# Patient Record
Sex: MALE | Race: WHITE | NOT HISPANIC OR LATINO | Employment: OTHER | URBAN - METROPOLITAN AREA
[De-identification: names, ages, dates, MRNs, and addresses within clinical notes are randomized per-mention and may not be internally consistent; named-entity substitution may affect disease eponyms.]

---

## 2024-05-08 ENCOUNTER — OFFICE VISIT (OUTPATIENT)
Dept: FAMILY MEDICINE | Facility: CLINIC | Age: 64
End: 2024-05-08
Payer: COMMERCIAL

## 2024-05-08 VITALS
HEART RATE: 78 BPM | SYSTOLIC BLOOD PRESSURE: 139 MMHG | HEIGHT: 67 IN | RESPIRATION RATE: 19 BRPM | TEMPERATURE: 97.8 F | BODY MASS INDEX: 26.53 KG/M2 | WEIGHT: 169 LBS | DIASTOLIC BLOOD PRESSURE: 84 MMHG | OXYGEN SATURATION: 100 %

## 2024-05-08 DIAGNOSIS — D50.9 IRON DEFICIENCY ANEMIA, UNSPECIFIED IRON DEFICIENCY ANEMIA TYPE: ICD-10-CM

## 2024-05-08 DIAGNOSIS — I21.4 NSTEMI (NON-ST ELEVATED MYOCARDIAL INFARCTION) (H): ICD-10-CM

## 2024-05-08 DIAGNOSIS — Z09 HOSPITAL DISCHARGE FOLLOW-UP: Primary | ICD-10-CM

## 2024-05-08 DIAGNOSIS — E11.9 TYPE 2 DIABETES MELLITUS WITHOUT COMPLICATION, WITHOUT LONG-TERM CURRENT USE OF INSULIN (H): ICD-10-CM

## 2024-05-08 PROCEDURE — 99204 OFFICE O/P NEW MOD 45 MIN: CPT | Performed by: FAMILY MEDICINE

## 2024-05-08 RX ORDER — NITROGLYCERIN 0.4 MG/1
0.4 TABLET SUBLINGUAL
COMMUNITY
Start: 2024-05-03

## 2024-05-08 RX ORDER — BLOOD SUGAR DIAGNOSTIC
STRIP MISCELLANEOUS
COMMUNITY
Start: 2024-05-03 | End: 2024-05-30

## 2024-05-08 RX ORDER — PANTOPRAZOLE SODIUM 40 MG/1
40 TABLET, DELAYED RELEASE ORAL
COMMUNITY
Start: 2024-05-04 | End: 2024-09-24

## 2024-05-08 RX ORDER — INSULIN GLARGINE 100 [IU]/ML
15 INJECTION, SOLUTION SUBCUTANEOUS
COMMUNITY
Start: 2024-05-03 | End: 2024-06-25

## 2024-05-08 RX ORDER — ASPIRIN 81 MG/1
81 TABLET, CHEWABLE ORAL
COMMUNITY
Start: 2024-05-04

## 2024-05-08 RX ORDER — METOPROLOL SUCCINATE 25 MG/1
12.5 TABLET, EXTENDED RELEASE ORAL
COMMUNITY
Start: 2024-05-04

## 2024-05-08 RX ORDER — CLOPIDOGREL BISULFATE 75 MG/1
75 TABLET ORAL
COMMUNITY
Start: 2024-05-07

## 2024-05-08 RX ORDER — LOSARTAN POTASSIUM 25 MG/1
12.5 TABLET ORAL
COMMUNITY
Start: 2024-05-04

## 2024-05-08 RX ORDER — ROSUVASTATIN CALCIUM 40 MG/1
40 TABLET, COATED ORAL DAILY
COMMUNITY
Start: 2024-05-03

## 2024-05-08 RX ORDER — PRASUGREL 10 MG/1
1 TABLET, FILM COATED ORAL EVERY MORNING
COMMUNITY
Start: 2024-05-03 | End: 2024-06-25 | Stop reason: ALTCHOICE

## 2024-05-08 ASSESSMENT — PAIN SCALES - GENERAL: PAINLEVEL: NO PAIN (0)

## 2024-05-08 ASSESSMENT — PATIENT HEALTH QUESTIONNAIRE - PHQ9
SUM OF ALL RESPONSES TO PHQ QUESTIONS 1-9: 0
10. IF YOU CHECKED OFF ANY PROBLEMS, HOW DIFFICULT HAVE THESE PROBLEMS MADE IT FOR YOU TO DO YOUR WORK, TAKE CARE OF THINGS AT HOME, OR GET ALONG WITH OTHER PEOPLE: NOT DIFFICULT AT ALL
SUM OF ALL RESPONSES TO PHQ QUESTIONS 1-9: 0

## 2024-05-08 NOTE — PROGRESS NOTES
"  Assessment & Plan     Hospital discharge follow-up   Admitted for NSTEMI, had stents placed and started on anticougulation    NSTEMI     S/p Stent placement. On DOAC for 1 year and ASA lifetime.    Heart failure with reduced ejection fraction, NYHA class I (H)   -  stable at this time.    Type 2 diabetes mellitus without complication, with long-term current use of insulin (H): New Diagnosis    - New diagnosis - HA1c 13.7%  SGLT2i were cost prohibitive, our clinic will work on patient assistance and start as out-pt. (On Insulin and Jardiance)    Iron deficiency anemia, unspecified iron deficiency anemia type     Hgb stable 11-12. Iron studies showed low iron level, ferritin normal  - he will need GI work-up (colonoscopy +/- EGD) in coming months     MED REC REQUIRED  Post Medication Reconciliation Status: discharge medications reconciled, continue medications without change  BMI  Estimated body mass index is 26.53 kg/m  as calculated from the following:    Height as of this encounter: 1.7 m (5' 6.93\").    Weight as of this encounter: 76.7 kg (169 lb).   Weight management plan: Discussed healthy diet and exercise guidelines      See Patient Instructions    Subjective   Yandel is a 64 year old, presenting for the following health issues:  Hospital F/U (New Patient - Establish Care )        5/8/2024    10:50 AM   Additional Questions   Roomed by KENDRA MCCOLLUM   Accompanied by self     HPI   Hospital Follow-up Visit:  Hospital/Nursing Home/IP Rehab Facility:  [facility]  Date of Admission: 5/1/2024  Date of Discharge: 5/3/2024  Reason(s) for Admission: ST elevation myocardial infarction (STEMI), unspecified artery (HC) (Primary Dx);  Diabetic ketoacidosis without coma associated with type 2 diabetes mellitus (HC);  Anemia, unspecified type;  Heart failure with mildly reduced ejection fraction (HFmrEF) (HC)  Discharge Disposition: Home Self Care   Was the patient in the ICU or did the patient experience delirium during " hospitalization?  No  Do you have any other stressors you would like to discuss with your provider? No    Problems taking medications regularly:  None  Medication changes since discharge: None  Problems adhering to non-medication therapy:  None    Summary of hospitalization:  [facility] information obtained and reviewed      Admission Date: 5/1/2024  Discharge Date: 5/3/2024     DISCHARGE DIAGNOSIS:  Inferolateral MI  Acute total occlusion of pLCx, severe disease in pPDA, ostial and mid LAD, D1. S/p PCI to pLCx and pPDA Plan for staged PCI to LAD and diag in 2-3 weeks  ASA 81mg QD, Effient 10mg QD for one year, rosuvastatin and BB as below.   HFmrEF, ICM, LVEF 40-45%  With basal to mid inferior and inferolateral HK.   Toprol XL 12.5mg QD and losartan 12.5mg QD  Hyperlipidemia  FLP this admission - , , with goals < 150 and 55, respectively.  Rosuvastatin 40mg QD  DM Type II  New diagnosis - HA1c 13.7%  SGLT2i were cost prohibitive, our clinic will work on patient assistance and start as out-pt. (Jardiance. Invokana, Farxiga)  Microcytic anemia  Hgb stable 11-12. Iron studies showed low iron level, ferritin normal     Further work-up per IM   - he will need GI work-up (colonoscopy +/- EGD) in coming months - ok to hold Effient at that time but would continue ASA.  Need to follow Hgb on DAPT     Post hospital f/up  Post-hospital follow up at [facility] with Edmond Sanchez PA-C on 5/7/24 with BMP and Hgb at this visit.   Ongoing optimization of GDMT.   Needs to establish with PCP for ongoing diabetes management.   Staged PCI to LAD and D1 in 2-3 weeks, ordered and consented. Repeat BMP and CBC at time of staged PCI.   Cardiac rehab discussed and recommended.  Repeat fasting lipids in 6-8 weeks.   Repeat TTE in 2-3 months with optimal GDMT.   Out-patient consult to DM nurse educator.       Meds:   Lantus Solostar U-100 Insulin 100 unit/mL (3 mL) pen: Inject 15 units subcutaneous before bedtime.    losartan 25  "mg tablet  Take one-half tablet (12.5 mg) by mouth once daily.    metoprolol succinate 25 mg Sustained-Release tablet  (TOPROL XL)  Take one-half tablet (12.5 mg) by mouth once daily.    nitroglycerin 0.4 mg sublingual tablet  Place 1 Tablet (0.4 mg) under the tongue every 5 minutes if needed for Chest Pain. If no relief after 3 tablets call 911    pantoprazole 40 mg delayed-release tablet  For diagnoses: ST elevation myocardial infarction (STEMI), unspecified artery (HC), Anemia, unspecified type  Start taking on: May 4, 2024  Commonly known as: PROTONIX  Take 1 Tablet (40 mg) by mouth once daily before a meal.    prasugreL 10 mg Tab tablet  For diagnoses: ST elevation myocardial infarction (STEMI), unspecified artery (HC)  Commonly known as: EFFIENT  Take 1 Tablet (10 mg) by mouth every morning for 30 days then switch to Plavix.    rosuvastatin 40 mg tablet  For diagnoses: ST elevation myocardial infarction (STEMI), unspecified artery (HC)  Commonly known as: CRESTOR  Take 1 Tablet (40 mg) by mouth at bedtime.   Plan of care communicated with patient  Diagnostic Tests/Treatments reviewed.  Follow up needed: none  Other Healthcare Providers Involved in Patient s Care:         Specialist appointment - cardiology  Update since discharge: improved.       Review of Systems  Constitutional, HEENT, pulmonary, gi and gu systems are negative, except as otherwise noted.      Objective    /84 (BP Location: Right arm, Cuff Size: Adult Regular)   Pulse 78   Temp 97.8  F (36.6  C) (Oral)   Resp 19   Ht 1.7 m (5' 6.93\")   Wt 76.7 kg (169 lb)   SpO2 100%   BMI 26.53 kg/m    Body mass index is 26.53 kg/m .  Physical Exam   GENERAL: alert and no distress  RESP: lungs clear to auscultation - no rales, rhonchi or wheezes  CV: regular rate and rhythm,  no murmur, click or rub, no peripheral edema  ABDOMEN: soft, nontender, no masses and bowel sounds normal  MS: no gross musculoskeletal defects noted, no edema  PSYCH: " mentation appears normal, affect normal/bright    Signed Electronically by: Earl López MD    Answers submitted by the patient for this visit:  Patient Health Questionnaire (Submitted on 5/8/2024)  If you checked off any problems, how difficult have these problems made it for you to do your work, take care of things at home, or get along with other people?: Not difficult at all  PHQ9 TOTAL SCORE: 0

## 2024-05-30 DIAGNOSIS — Z79.4 TYPE 2 DIABETES MELLITUS WITH HYPERGLYCEMIA, WITH LONG-TERM CURRENT USE OF INSULIN (H): Primary | ICD-10-CM

## 2024-05-30 DIAGNOSIS — E11.65 TYPE 2 DIABETES MELLITUS WITH HYPERGLYCEMIA, WITH LONG-TERM CURRENT USE OF INSULIN (H): Primary | ICD-10-CM

## 2024-05-30 NOTE — TELEPHONE ENCOUNTER
Medication Question or Refill        What medication are you calling about (include dose and sig)?: blood glucose (OPTIUM TEST STRIPS) test strip (patient was told to test 4 times a day, 100 does not last a whole month)    Preferred Pharmacy:   [id number] - [place] - [address] AT [facility] & [place]  [address] [facility]  [place] [address]  Phone: [phone] Fax: [phone]      Controlled Substance Agreement on file:   CSA -- Patient Level:    CSA: None found at the patient level.       Who prescribed the medication?: PCP    Do you need a refill? Yes    When did you use the medication last? Daily, x 4 a day    Patient offered an appointment? No    Do you have any questions or concerns?  Yes: patient only has a few left      Okay to leave a detailed message?: Yes at [phone] Mita , spouse

## 2024-05-31 RX ORDER — BLOOD SUGAR DIAGNOSTIC
STRIP MISCELLANEOUS
Qty: 120 STRIP | Refills: 1 | Status: SHIPPED | OUTPATIENT
Start: 2024-05-31 | End: 2024-07-01

## 2024-06-25 ENCOUNTER — OFFICE VISIT (OUTPATIENT)
Dept: FAMILY MEDICINE | Facility: CLINIC | Age: 64
End: 2024-06-25
Payer: COMMERCIAL

## 2024-06-25 VITALS
BODY MASS INDEX: 26.21 KG/M2 | WEIGHT: 167 LBS | HEIGHT: 67 IN | OXYGEN SATURATION: 100 % | DIASTOLIC BLOOD PRESSURE: 72 MMHG | SYSTOLIC BLOOD PRESSURE: 111 MMHG | HEART RATE: 66 BPM | RESPIRATION RATE: 18 BRPM | TEMPERATURE: 98.4 F

## 2024-06-25 DIAGNOSIS — Z12.11 SCREEN FOR COLON CANCER: ICD-10-CM

## 2024-06-25 DIAGNOSIS — Z23 NEED FOR SHINGLES VACCINE: ICD-10-CM

## 2024-06-25 DIAGNOSIS — E78.5 HYPERLIPIDEMIA LDL GOAL <70: ICD-10-CM

## 2024-06-25 DIAGNOSIS — D50.9 IRON DEFICIENCY ANEMIA, UNSPECIFIED IRON DEFICIENCY ANEMIA TYPE: ICD-10-CM

## 2024-06-25 DIAGNOSIS — Z11.59 NEED FOR HEPATITIS C SCREENING TEST: ICD-10-CM

## 2024-06-25 DIAGNOSIS — Z11.4 SCREENING FOR HIV (HUMAN IMMUNODEFICIENCY VIRUS): ICD-10-CM

## 2024-06-25 DIAGNOSIS — I25.10 CORONARY ARTERY DISEASE INVOLVING NATIVE HEART WITHOUT ANGINA PECTORIS, UNSPECIFIED VESSEL OR LESION TYPE: ICD-10-CM

## 2024-06-25 DIAGNOSIS — Z79.4 TYPE 2 DIABETES MELLITUS WITH HYPERGLYCEMIA, WITH LONG-TERM CURRENT USE OF INSULIN (H): Primary | ICD-10-CM

## 2024-06-25 DIAGNOSIS — Z29.11 NEED FOR VACCINATION AGAINST RESPIRATORY SYNCYTIAL VIRUS: ICD-10-CM

## 2024-06-25 DIAGNOSIS — Z12.5 SCREENING FOR PROSTATE CANCER: ICD-10-CM

## 2024-06-25 DIAGNOSIS — E11.65 TYPE 2 DIABETES MELLITUS WITH HYPERGLYCEMIA, WITH LONG-TERM CURRENT USE OF INSULIN (H): Primary | ICD-10-CM

## 2024-06-25 DIAGNOSIS — Z23 NEED FOR TDAP VACCINATION: ICD-10-CM

## 2024-06-25 PROCEDURE — 90677 PCV20 VACCINE IM: CPT | Performed by: FAMILY MEDICINE

## 2024-06-25 PROCEDURE — 99214 OFFICE O/P EST MOD 30 MIN: CPT | Mod: 25 | Performed by: FAMILY MEDICINE

## 2024-06-25 PROCEDURE — G0009 ADMIN PNEUMOCOCCAL VACCINE: HCPCS | Performed by: FAMILY MEDICINE

## 2024-06-25 PROCEDURE — 82274 ASSAY TEST FOR BLOOD FECAL: CPT | Performed by: FAMILY MEDICINE

## 2024-06-25 RX ORDER — RESPIRATORY SYNCYTIAL VIRUS VACCINE 120MCG/0.5
0.5 KIT INTRAMUSCULAR ONCE
Qty: 1 EACH | Refills: 0 | Status: CANCELLED | OUTPATIENT
Start: 2024-06-25 | End: 2024-06-25

## 2024-06-25 RX ORDER — INSULIN GLARGINE 100 [IU]/ML
15 INJECTION, SOLUTION SUBCUTANEOUS AT BEDTIME
Qty: 15 ML | Refills: 3 | Status: SHIPPED | OUTPATIENT
Start: 2024-06-25

## 2024-06-25 NOTE — PROGRESS NOTES
5/8  Hospital discharge follow-up   Admitted for NSTEMI, had stents placed and started on anticougulation     NSTEMI     S/p Stent placement. On DOAC for 1 year and ASA lifetime.     Heart failure with reduced ejection fraction, NYHA class I (H)   -  stable at this time.     Type 2 diabetes mellitus without complication, with long-term current use of insulin (H): New Diagnosis    - New diagnosis - HA1c 13.7%  SGLT2i were cost prohibitive, our clinic will work on patient assistance and start as out-pt. (On Insulin and Jardiance)     Iron deficiency anemia, unspecified iron deficiency anemia type     Hgb stable 11-12. Iron studies showed low iron level, ferritin normal  - he will need GI work-up (colonoscopy +/- EGD) in coming months

## 2024-06-25 NOTE — LETTER
July 8, 2024    Yandel Coats  [address]  [place] [address]          Dear ,    We are writing to inform you of your test results.     The stool test for colon cancer screening is negative; this test find at least 79 out of 100 people if there is cancer/can miss in about 21 out of 100 people. A repeat stool test with FIT or a colonoscopy (if there is cancer will find at least it in 98 out of 100 people) is recommended in 1 year.       Resulted Orders   Fecal colorectal cancer screen (FIT)   Result Value Ref Range    Occult Blood Screen FIT Negative Negative       If you have any questions or concerns, please call the clinic at the number listed above.       Sincerely,      Earl López MD

## 2024-06-25 NOTE — PROGRESS NOTES
Assessment & Plan     Type 2 diabetes mellitus with hyperglycemia, with long-term current use of insulin (H)   Last A1c 5/14/24 was 13.7 and just started on treatment, will recheck A1c in 2 months. Blood sugars been good on Jardiance and Insulin.  - Albumin Random Urine Quantitative with Creat Ratio  - Hemoglobin A1c  - Comprehensive metabolic panel (BMP + Alb, Alk Phos, ALT, AST, Total. Bili, TP)  - insulin glargine (LANTUS SOLOSTAR) 100 UNIT/ML pen  Dispense: 15 mL; Refill: 3  - Lipase    Coronary artery disease involving native heart without angina pectoris, unspecified vessel or lesion type      S/p Stent placement. On DOAC for 1 year and ASA lifetime.  - Comprehensive metabolic panel (BMP + Alb, Alk Phos, ALT, AST, Total. Bili, TP)    Iron deficiency anemia, unspecified iron deficiency anemia type   Will check if has RBCs in stool will order colonoscopy, for possible chronic GI blood loss  - Fecal colorectal cancer screen (FIT)  - CBC with platelets and differential  - Fecal colorectal cancer screen (FIT)    Hyperlipidemia LDL goal <70   -  On statin    Need for shingles vaccine  Need for Tdap vaccination  Need for vaccination against respiratory syncytial virus    -  deferring at this time    Screen for colon cancer    -  Fecal colorectal cancer screen (FIT)    Screening for HIV (human immunodeficiency virus)    No concerns    Screening for prostate cancer  - PSA, screen    Blood sugar testing frequency justification:  Uncontrolled diabetes    See Patient Instructions    Subjective   Yandel is a 64 year old, presenting for the following health issues:  Follow Up    DM2:   Blood sugar have improved; in -180, rarely over 200    Shots:   TDAP five yrs ago   Covid: Had 2 shots 21 and 22    Deferring rest      6/25/2024     8:42 AM   Additional Questions   Roomed by heaven MARY   Last Echo: No results found.{        Review of Systems  Constitutional, HEENT, cardiovascular, pulmonary, gi and gu systems  "are negative, except as otherwise noted.      Objective    /72   Pulse 66   Temp 98.4  F (36.9  C) (Temporal)   Resp 18   Ht 1.7 m (5' 6.93\")   Wt 75.8 kg (167 lb)   SpO2 100%   BMI 26.21 kg/m    Body mass index is 26.21 kg/m .  Physical Exam   GENERAL: alert and no distress  RESP: lungs clear to auscultation - no rales, rhonchi or wheezes  CV: regular rate and rhythm, no murmur, click or rub, no peripheral edema  ABDOMEN: soft, nontender, no masses and bowel sounds normal  MS: no gross musculoskeletal defects noted, no edema    Signed Electronically by: Earl López MD    "

## 2024-06-29 DIAGNOSIS — Z79.4 TYPE 2 DIABETES MELLITUS WITH HYPERGLYCEMIA, WITH LONG-TERM CURRENT USE OF INSULIN (H): ICD-10-CM

## 2024-06-29 DIAGNOSIS — E11.65 TYPE 2 DIABETES MELLITUS WITH HYPERGLYCEMIA, WITH LONG-TERM CURRENT USE OF INSULIN (H): ICD-10-CM

## 2024-07-01 RX ORDER — BLOOD SUGAR DIAGNOSTIC
STRIP MISCELLANEOUS
Qty: 100 STRIP | Refills: 0 | Status: SHIPPED | OUTPATIENT
Start: 2024-07-01 | End: 2024-07-23

## 2024-07-05 LAB — HEMOCCULT STL QL IA: NEGATIVE

## 2024-07-23 DIAGNOSIS — E11.65 TYPE 2 DIABETES MELLITUS WITH HYPERGLYCEMIA, WITH LONG-TERM CURRENT USE OF INSULIN (H): ICD-10-CM

## 2024-07-23 DIAGNOSIS — Z79.4 TYPE 2 DIABETES MELLITUS WITH HYPERGLYCEMIA, WITH LONG-TERM CURRENT USE OF INSULIN (H): ICD-10-CM

## 2024-07-23 RX ORDER — BLOOD SUGAR DIAGNOSTIC
STRIP MISCELLANEOUS
Qty: 100 STRIP | Refills: 0 | Status: SHIPPED | OUTPATIENT
Start: 2024-07-23 | End: 2024-09-24

## 2024-07-26 ENCOUNTER — OFFICE VISIT (OUTPATIENT)
Dept: FAMILY MEDICINE | Facility: CLINIC | Age: 64
End: 2024-07-26
Attending: FAMILY MEDICINE
Payer: COMMERCIAL

## 2024-07-26 VITALS
RESPIRATION RATE: 19 BRPM | WEIGHT: 168.6 LBS | HEIGHT: 67 IN | HEART RATE: 75 BPM | DIASTOLIC BLOOD PRESSURE: 77 MMHG | OXYGEN SATURATION: 100 % | SYSTOLIC BLOOD PRESSURE: 117 MMHG | BODY MASS INDEX: 26.46 KG/M2 | TEMPERATURE: 97.2 F

## 2024-07-26 DIAGNOSIS — Z79.4 TYPE 2 DIABETES MELLITUS WITH HYPERGLYCEMIA, WITH LONG-TERM CURRENT USE OF INSULIN (H): Primary | ICD-10-CM

## 2024-07-26 DIAGNOSIS — Z11.59 NEED FOR HEPATITIS C SCREENING TEST: ICD-10-CM

## 2024-07-26 DIAGNOSIS — D50.9 IRON DEFICIENCY ANEMIA, UNSPECIFIED IRON DEFICIENCY ANEMIA TYPE: ICD-10-CM

## 2024-07-26 DIAGNOSIS — I25.10 CORONARY ARTERY DISEASE INVOLVING NATIVE CORONARY ARTERY OF NATIVE HEART WITHOUT ANGINA PECTORIS: ICD-10-CM

## 2024-07-26 DIAGNOSIS — E11.65 TYPE 2 DIABETES MELLITUS WITH HYPERGLYCEMIA, WITH LONG-TERM CURRENT USE OF INSULIN (H): Primary | ICD-10-CM

## 2024-07-26 DIAGNOSIS — I25.10 CORONARY ARTERY DISEASE INVOLVING NATIVE HEART WITHOUT ANGINA PECTORIS, UNSPECIFIED VESSEL OR LESION TYPE: ICD-10-CM

## 2024-07-26 DIAGNOSIS — I50.22 HEART FAILURE WITH MILDLY REDUCED EJECTION FRACTION (HFMREF) (H): ICD-10-CM

## 2024-07-26 DIAGNOSIS — Z12.5 SCREENING FOR PROSTATE CANCER: ICD-10-CM

## 2024-07-26 LAB
ALBUMIN SERPL BCG-MCNC: 4.1 G/DL (ref 3.5–5.2)
ALP SERPL-CCNC: 80 U/L (ref 40–150)
ALT SERPL W P-5'-P-CCNC: 25 U/L (ref 0–70)
ANION GAP SERPL CALCULATED.3IONS-SCNC: 10 MMOL/L (ref 7–15)
AST SERPL W P-5'-P-CCNC: 25 U/L (ref 0–45)
BASOPHILS # BLD AUTO: 0 10E3/UL (ref 0–0.2)
BASOPHILS NFR BLD AUTO: 0 %
BILIRUB SERPL-MCNC: 0.3 MG/DL
BUN SERPL-MCNC: 17.3 MG/DL (ref 8–23)
CALCIUM SERPL-MCNC: 8.9 MG/DL (ref 8.8–10.4)
CHLORIDE SERPL-SCNC: 104 MMOL/L (ref 98–107)
CHOLEST SERPL-MCNC: 111 MG/DL
CREAT SERPL-MCNC: 0.95 MG/DL (ref 0.67–1.17)
CREAT UR-MCNC: 72.4 MG/DL
EGFRCR SERPLBLD CKD-EPI 2021: 89 ML/MIN/1.73M2
EOSINOPHIL # BLD AUTO: 0.2 10E3/UL (ref 0–0.7)
EOSINOPHIL NFR BLD AUTO: 3 %
ERYTHROCYTE [DISTWIDTH] IN BLOOD BY AUTOMATED COUNT: 14 % (ref 10–15)
FASTING STATUS PATIENT QL REPORTED: NO
FASTING STATUS PATIENT QL REPORTED: NO
GLUCOSE SERPL-MCNC: 164 MG/DL (ref 70–99)
HBA1C MFR BLD: 7.7 % (ref 0–5.6)
HCO3 SERPL-SCNC: 25 MMOL/L (ref 22–29)
HCT VFR BLD AUTO: 41.7 % (ref 40–53)
HCV AB SERPL QL IA: NONREACTIVE
HDLC SERPL-MCNC: 55 MG/DL
HGB BLD-MCNC: 12.8 G/DL (ref 13.3–17.7)
IMM GRANULOCYTES # BLD: 0 10E3/UL
IMM GRANULOCYTES NFR BLD: 0 %
LDLC SERPL CALC-MCNC: 42 MG/DL
LIPASE SERPL-CCNC: 27 U/L (ref 13–60)
LYMPHOCYTES # BLD AUTO: 1.8 10E3/UL (ref 0.8–5.3)
LYMPHOCYTES NFR BLD AUTO: 27 %
MCH RBC QN AUTO: 23 PG (ref 26.5–33)
MCHC RBC AUTO-ENTMCNC: 30.7 G/DL (ref 31.5–36.5)
MCV RBC AUTO: 75 FL (ref 78–100)
MICROALBUMIN UR-MCNC: <12 MG/L
MICROALBUMIN/CREAT UR: NORMAL MG/G{CREAT}
MONOCYTES # BLD AUTO: 0.6 10E3/UL (ref 0–1.3)
MONOCYTES NFR BLD AUTO: 9 %
NEUTROPHILS # BLD AUTO: 4.1 10E3/UL (ref 1.6–8.3)
NEUTROPHILS NFR BLD AUTO: 61 %
NONHDLC SERPL-MCNC: 56 MG/DL
PLATELET # BLD AUTO: 274 10E3/UL (ref 150–450)
POTASSIUM SERPL-SCNC: 4.9 MMOL/L (ref 3.4–5.3)
PROT SERPL-MCNC: 7.4 G/DL (ref 6.4–8.3)
PSA SERPL DL<=0.01 NG/ML-MCNC: 0.49 NG/ML (ref 0–4.5)
RBC # BLD AUTO: 5.56 10E6/UL (ref 4.4–5.9)
SODIUM SERPL-SCNC: 139 MMOL/L (ref 135–145)
TRIGL SERPL-MCNC: 72 MG/DL
WBC # BLD AUTO: 6.7 10E3/UL (ref 4–11)

## 2024-07-26 PROCEDURE — 83690 ASSAY OF LIPASE: CPT | Performed by: FAMILY MEDICINE

## 2024-07-26 PROCEDURE — 82570 ASSAY OF URINE CREATININE: CPT | Performed by: FAMILY MEDICINE

## 2024-07-26 PROCEDURE — 86803 HEPATITIS C AB TEST: CPT | Performed by: FAMILY MEDICINE

## 2024-07-26 PROCEDURE — 85025 COMPLETE CBC W/AUTO DIFF WBC: CPT | Performed by: FAMILY MEDICINE

## 2024-07-26 PROCEDURE — 99214 OFFICE O/P EST MOD 30 MIN: CPT | Performed by: FAMILY MEDICINE

## 2024-07-26 PROCEDURE — 80053 COMPREHEN METABOLIC PANEL: CPT | Performed by: FAMILY MEDICINE

## 2024-07-26 PROCEDURE — 82043 UR ALBUMIN QUANTITATIVE: CPT | Performed by: FAMILY MEDICINE

## 2024-07-26 PROCEDURE — 36415 COLL VENOUS BLD VENIPUNCTURE: CPT | Performed by: FAMILY MEDICINE

## 2024-07-26 PROCEDURE — G0103 PSA SCREENING: HCPCS | Performed by: FAMILY MEDICINE

## 2024-07-26 PROCEDURE — G2211 COMPLEX E/M VISIT ADD ON: HCPCS | Performed by: FAMILY MEDICINE

## 2024-07-26 PROCEDURE — 80061 LIPID PANEL: CPT | Performed by: FAMILY MEDICINE

## 2024-07-26 PROCEDURE — 83036 HEMOGLOBIN GLYCOSYLATED A1C: CPT | Performed by: FAMILY MEDICINE

## 2024-07-26 RX ORDER — FERROUS SULFATE 325(65) MG
325 TABLET ORAL
Qty: 90 TABLET | Refills: 0 | Status: SHIPPED | OUTPATIENT
Start: 2024-07-26

## 2024-07-26 RX ORDER — PANTOPRAZOLE SODIUM 40 MG/1
40 TABLET, DELAYED RELEASE ORAL DAILY
Qty: 90 TABLET | Refills: 0 | Status: SHIPPED | OUTPATIENT
Start: 2024-07-26

## 2024-07-26 ASSESSMENT — PAIN SCALES - GENERAL: PAINLEVEL: NO PAIN (0)

## 2024-07-26 NOTE — PROGRESS NOTES
Assessment & Plan     Type 2 diabetes mellitus with hyperglycemia, with long-term current use of insulin (H)   Recent diagnosis of diabetes, discussed comprehensive management. Refills given  - Adult Eye  Referral  - FOOT EXAM  - Albumin Random Urine Quantitative with Creat Ratio  - Hemoglobin A1c  - Comprehensive metabolic panel (BMP + Alb, Alk Phos, ALT, AST, Total. Bili, TP)  - Lipase  - insulin pen needle (32G X 4 MM) 32G X 4 MM miscellaneous  Dispense: 100 each; Refill: 3    Coronary artery disease involving native heart without angina pectoris, unspecified vessel or lesion type   Acute total occlusion of pLCx, severe disease in pPDA, ostial and mid LAD, D1. S/p PCI to pLCx and pPDA and staged PCI to LAD    Doing cardiac rehab and going well so far  - Lipid panel reflex to direct LDL Non-fasting  - Comprehensive metabolic panel (BMP + Alb, Alk Phos, ALT, AST, Total. Bili, TP)  - pantoprazole (PROTONIX) 40 MG EC tablet  Dispense: 90 tablet; Refill: 0  - Lipid panel reflex to direct LDL Non-fasting    Heart failure with mildly reduced ejection fraction (HFmrEF) (H)   - Resolved    Iron deficiency anemia, unspecified iron deficiency anemia type   Been persistent will do iron supplement  - CBC with platelets and differential  - ferrous sulfate (FEROSUL) 325 (65 Fe) MG tablet  Dispense: 90 tablet; Refill: 0    Need for hepatitis C screening test  - Hepatitis C Screen Reflex to HCV RNA Quant and Genotype  - Hepatitis C Screen Reflex to HCV RNA Quant and Genotype    Screening for prostate cancer  - PSA, screen          Blood sugar testing frequency justification:  Risk of hypoglycemia with medication(s)    See Patient Instructions    William Humphries is a 64 year old, presenting for the following health issues:  Folllow Up  (Last seen 6/25/2024)      7/26/2024     8:09 AM   Additional Questions   Roomed by KENDRA MCCOLLUM   Accompanied by self     History of Present Illness       Diabetes:   He presents for  follow up of diabetes.  He is checking home blood glucose three times daily.   He checks blood glucose before meals and at bedtime.  Blood glucose is never over 200 and never under 70.  When his blood glucose is low, the patient is asymptomatic for confusion, blurred vision, lethargy and reports not feeling dizzy, shaky, or weak.   He has no concerns regarding his diabetes at this time.   He is not experiencing numbness or burning in feet, excessive thirst, blurry vision, weight changes or redness, sores or blisters on feet. The patient has not had a diabetic eye exam in the last 12 months.            DM2:   Highest BS been 130's   On Insulin 15 units    CAD:    Doing cadiac rehab x twice a week.     Would like LDL check and Fax  Att [place]  Been golfing 18 holes    TDAP and Covid: 4 yrsago      Anemia:   Consider colonoscopy/FIT  IRON  61 - 157 ug/dL 34 Low    UIBC (UNSATURATED)  112 - 347 ug/dL 215   IRON BINDING CAPACITY  250 - 400 ug/dL 249 Low    IRON,% SATURATION  14 - 50 % 14     Diabetes Follow-up    How often are you checking your blood sugar? One time daily  What time of day are you checking your blood sugars (select all that apply)?  Before meals  Have you had any blood sugars above 200?  No  Have you had any blood sugars below 70?  No  What symptoms do you notice when your blood sugar is low?  None  What concerns do you have today about your diabetes? None   Do you have any of these symptoms? (Select all that apply)  No numbness or tingling in feet.  No redness, sores or blisters on feet.  No complaints of excessive thirst.  No reports of blurry vision.  No significant changes to weight.  Have you had a diabetic eye exam in the last 12 months? No        BP Readings from Last 2 Encounters:   07/26/24 117/77   06/25/24 111/72     Hemoglobin A1C (%)   Date Value   07/26/2024 7.7 (H)       Vascular Disease Follow-up   5/1/24: Acute total occlusion of pLCx, severe disease in pPDA, ostial and mid  "LAD, D1. S/p PCI to pLCx and pPDA and staged PCI to LAD   How often do you take nitroglycerin? Never  Do you take an aspirin every day? Yes    Heart Failure Follow-up   Are you experiencing any shortness of breath? No  Are you experiencing any swelling in your legs or feet?  No  Are you using more pillows than usual? No  Do you cough at night?  No  Do you check your weight daily?  No  Have you had a weight change recently?  No  Are you having any of the following side effects from your medications? (Select all that apply)  The patient does not report symptoms of dizziness, fatigue, cough, swelling, or slow heart beat.  Since your last visit, how many times have you gone to the cardiologist, urgent care, emergency room, or hospital because of your heart failure?   None    Last Echo:   TTE 5/2/2024:  Final Conclusion  Visually Estimated EF: 40-45%  Technically difficult study - contrast was used to enhance endocardial definition due to suboptimal image quality.  Normal left ventricular size. Mild to moderately abnormal left ventricular ejection fraction estimated at 40-45%.  Basal and mid inferior and inferior lateral hypokinesis noted  Normal right ventricular size and function.  No significant valvular heart disease noted.  No significant pericardial effusion.  Echo free space anterior to the right ventricle likely represents a fat pad.  Normal IVC size, >50% collapse with respiration     Review of Systems  Constitutional, HEENT, cardiovascular, pulmonary, gi and gu systems are negative, except as otherwise noted.      Objective    /77 (BP Location: Right arm, Cuff Size: Adult Regular)   Pulse 75   Temp 97.2  F (36.2  C) (Temporal)   Resp 19   Ht 1.71 m (5' 7.32\")   Wt 76.5 kg (168 lb 9.6 oz)   SpO2 100%   BMI 26.15 kg/m    Body mass index is 26.15 kg/m .  Physical Exam   GENERAL: alert and no distress  RESP: lungs clear to auscultation - no rales, rhonchi or wheezes  CV: regular rate and rhythm, no " murmur, click or rub, no peripheral edema  ABDOMEN: soft, nontender, no masses and bowel sounds normal  MS: no gross musculoskeletal defects noted, no edema  NEURO: Normal strength and tone, mentation intact and speech normal  PSYCH: mentation appears normal, affect normal/bright    Signed Electronically by: Earl López MD

## 2024-07-27 PROBLEM — I25.10 CORONARY ARTERY DISEASE INVOLVING NATIVE CORONARY ARTERY OF NATIVE HEART WITHOUT ANGINA PECTORIS: Status: ACTIVE | Noted: 2024-07-27

## 2024-07-27 PROBLEM — E11.9 DIABETES MELLITUS, TYPE 2 (H): Status: ACTIVE | Noted: 2024-07-27

## 2024-08-15 ENCOUNTER — OFFICE VISIT (OUTPATIENT)
Dept: OPHTHALMOLOGY | Facility: CLINIC | Age: 64
End: 2024-08-15
Attending: FAMILY MEDICINE
Payer: COMMERCIAL

## 2024-08-15 DIAGNOSIS — Z79.4 TYPE 2 DIABETES MELLITUS WITH HYPERGLYCEMIA, WITH LONG-TERM CURRENT USE OF INSULIN (H): ICD-10-CM

## 2024-08-15 DIAGNOSIS — H52.4 PRESBYOPIA: ICD-10-CM

## 2024-08-15 DIAGNOSIS — E11.65 TYPE 2 DIABETES MELLITUS WITH HYPERGLYCEMIA, WITH LONG-TERM CURRENT USE OF INSULIN (H): ICD-10-CM

## 2024-08-15 DIAGNOSIS — Z01.00 EXAMINATION OF EYES AND VISION: Primary | ICD-10-CM

## 2024-08-15 PROCEDURE — 92015 DETERMINE REFRACTIVE STATE: CPT | Performed by: OPHTHALMOLOGY

## 2024-08-15 PROCEDURE — 92004 COMPRE OPH EXAM NEW PT 1/>: CPT | Performed by: OPHTHALMOLOGY

## 2024-08-15 ASSESSMENT — CUP TO DISC RATIO
OS_RATIO: 0.3
OD_RATIO: 0.2

## 2024-08-15 ASSESSMENT — REFRACTION_MANIFEST
OS_CYLINDER: +0.50
OS_ADD: +2.25
OD_ADD: +2.25
OS_AXIS: 165
OD_SPHERE: -1.00
OD_AXIS: 007
OD_CYLINDER: +0.75
OS_SPHERE: -1.00

## 2024-08-15 ASSESSMENT — CONF VISUAL FIELD
OS_INFERIOR_NASAL_RESTRICTION: 0
OD_SUPERIOR_TEMPORAL_RESTRICTION: 0
OD_INFERIOR_NASAL_RESTRICTION: 0
OD_SUPERIOR_NASAL_RESTRICTION: 0
OD_NORMAL: 1
OD_INFERIOR_TEMPORAL_RESTRICTION: 0
OS_SUPERIOR_TEMPORAL_RESTRICTION: 0
OS_INFERIOR_TEMPORAL_RESTRICTION: 0
OS_SUPERIOR_NASAL_RESTRICTION: 0
OS_NORMAL: 1

## 2024-08-15 ASSESSMENT — TONOMETRY
OD_IOP_MMHG: 14
IOP_METHOD: APPLANATION
OS_IOP_MMHG: 14

## 2024-08-15 ASSESSMENT — VISUAL ACUITY
OD_SC+: -1
OD_SC: 20/25
OS_PH_SC+: -1
METHOD: SNELLEN - LINEAR
OS_PH_SC: 20/20
OS_SC+: -2
OS_SC: 20/30

## 2024-08-15 ASSESSMENT — SLIT LAMP EXAM - LIDS
COMMENTS: NORMAL
COMMENTS: NORMAL

## 2024-08-15 ASSESSMENT — REFRACTION_WEARINGRX
OD_SPHERE: +0.75
OD_CYLINDER: SPHERE
SPECS_TYPE: OTC READERS
OS_CYLINDER: SPHERE
OS_SPHERE: +0.75

## 2024-08-15 ASSESSMENT — EXTERNAL EXAM - LEFT EYE: OS_EXAM: NORMAL

## 2024-08-15 ASSESSMENT — EXTERNAL EXAM - RIGHT EYE: OD_EXAM: NORMAL

## 2024-08-15 NOTE — PROGRESS NOTES
" Current Eye Medications:  none     Subjective: referred by Dr. López for diabetic eye exam.  First eye exam for patient today. Only has to wear readers, no distance glasses.  Found out he had diabetes when he had a heart attack on May 1st.  Blurred vision sometimes, but does not last very long. No flashes or floaters at all.     Hemoglobin A1C   Date Value Ref Range Status   07/26/2024 7.7 (H) 0.0 - 5.6 % Final     Comment:     Normal <5.7%   Prediabetes 5.7-6.4%    Diabetes 6.5% or higher     Note: Adopted from ADA consensus guidelines.      Had 3 stents, doing well.  A1C 15+ at diagnosis.     Objective:  See Ophthalmology Exam.       Assessment:  Baseline eye exam in patient with diabetes.  No diabetic retinopathy.      ICD-10-CM    1. Examination of eyes and vision  Z01.00       2. Presbyopia  H52.4       3. Type 2 diabetes mellitus with hyperglycemia, with long-term current use of insulin (H)  E11.65 Adult Eye  Referral    Z79.4            Plan:  Glasses prescription given - optional  Could increase to +1.25 or +1.50 OTC readers if desires.    May use artificial tears up to four times a day (like Refresh Optive, Systane Balance, or TheraTears. Avoid \"get the red out\" drops and generic artifical tears).     Possible posterior vitreous detachment (sudden onset large floater and/or flashing lights) both eyes discussed.     Call in April 2025 for an appointment in August 2025 for Complete Exam    Dr. Burnett [phone]          "

## 2024-08-15 NOTE — PATIENT INSTRUCTIONS
"Glasses prescription given - optional    May use artificial tears up to four times a day (like Refresh Optive, Systane Balance, or TheraTears. Avoid \"get the red out\" drops and generic artifical tears).     Possible posterior vitreous detachment (sudden onset large floater and/or flashing lights) both eyes discussed.     Call in April 2025 for an appointment in August 2025 for Complete Exam    Dr. Burnett [phone]      Patient Education   Diabetes weakens the blood vessels all over the body, including the eyes. Damage to the blood vessels in the eyes can cause swelling or bleeding into part of the eye (called the retina). This is called diabetic retinopathy (KOLBY-tin-AH-puh-thee). If not treated, this disease can cause vision loss or blindness.   Symptoms may include blurred or distorted vision, but many people have no symptoms. It's important to see your eye doctor regularly to check for problems.   Early treatment and good control can help protect your vision. Here are the things you can do to help prevent vision loss:      1. Keep your blood sugar levels under tight control.      2. Bring high blood pressure under control.      3. No smoking.      4. Have yearly dilated eye exams.     "

## 2024-08-15 NOTE — LETTER
"8/15/2024      Yandel Means [facility]  [place] [address]      Dear Colleague,    Thank you for referring your patient, Yandel Coats, to the [facility]. Please see a copy of my visit note below.     Current Eye Medications:  none     Subjective: referred by Dr. López for diabetic eye exam.  First eye exam for patient today. Only has to wear readers, no distance glasses.  Found out he had diabetes when he had a heart attack on May 1st.  Blurred vision sometimes, but does not last very long. No flashes or floaters at all.     Hemoglobin A1C   Date Value Ref Range Status   07/26/2024 7.7 (H) 0.0 - 5.6 % Final     Comment:     Normal <5.7%   Prediabetes 5.7-6.4%    Diabetes 6.5% or higher     Note: Adopted from ADA consensus guidelines.      Had 3 stents, doing well.  A1C 15+ at diagnosis.     Objective:  See Ophthalmology Exam.       Assessment:  Baseline eye exam in patient with diabetes.  No diabetic retinopathy.      ICD-10-CM    1. Examination of eyes and vision  Z01.00       2. Presbyopia  H52.4       3. Type 2 diabetes mellitus with hyperglycemia, with long-term current use of insulin (H)  E11.65 Adult Eye  Referral    Z79.4            Plan:  Glasses prescription given - optional  Could increase to +1.25 or +1.50 OTC readers if desires.    May use artificial tears up to four times a day (like Refresh Optive, Systane Balance, or TheraTears. Avoid \"get the red out\" drops and generic artifical tears).     Possible posterior vitreous detachment (sudden onset large floater and/or flashing lights) both eyes discussed.     Call in April 2025 for an appointment in August 2025 for Complete Exam    Dr. Burnett [phone]              Again, thank you for allowing me to participate in the care of your patient.        Sincerely,        Jonathan Burnett MD  "

## 2024-09-07 ENCOUNTER — HEALTH MAINTENANCE LETTER (OUTPATIENT)
Age: 64
End: 2024-09-07

## 2024-09-24 DIAGNOSIS — E11.65 TYPE 2 DIABETES MELLITUS WITH HYPERGLYCEMIA, WITH LONG-TERM CURRENT USE OF INSULIN (H): ICD-10-CM

## 2024-09-24 DIAGNOSIS — Z79.4 TYPE 2 DIABETES MELLITUS WITH HYPERGLYCEMIA, WITH LONG-TERM CURRENT USE OF INSULIN (H): ICD-10-CM

## 2024-09-24 RX ORDER — BLOOD SUGAR DIAGNOSTIC
STRIP MISCELLANEOUS
Qty: 100 STRIP | Refills: 0 | Status: CANCELLED | OUTPATIENT
Start: 2024-09-24

## 2024-09-24 NOTE — TELEPHONE ENCOUNTER
"\"Plan does not cover this medication preferred brand is now One Touch Ultra/Verio/ Patient id [id number]      Thank you  LS    "

## 2024-10-14 DIAGNOSIS — I25.10 CORONARY ARTERY DISEASE INVOLVING NATIVE HEART WITHOUT ANGINA PECTORIS, UNSPECIFIED VESSEL OR LESION TYPE: ICD-10-CM

## 2024-10-14 RX ORDER — PANTOPRAZOLE SODIUM 40 MG/1
40 TABLET, DELAYED RELEASE ORAL DAILY
Qty: 90 TABLET | Refills: 0 | Status: SHIPPED | OUTPATIENT
Start: 2024-10-14 | End: 2024-10-25

## 2024-10-25 ENCOUNTER — OFFICE VISIT (OUTPATIENT)
Dept: FAMILY MEDICINE | Facility: CLINIC | Age: 64
End: 2024-10-25
Payer: COMMERCIAL

## 2024-10-25 VITALS
BODY MASS INDEX: 26.34 KG/M2 | HEART RATE: 78 BPM | WEIGHT: 167.8 LBS | TEMPERATURE: 97.3 F | RESPIRATION RATE: 18 BRPM | SYSTOLIC BLOOD PRESSURE: 111 MMHG | OXYGEN SATURATION: 99 % | DIASTOLIC BLOOD PRESSURE: 76 MMHG | HEIGHT: 67 IN

## 2024-10-25 DIAGNOSIS — D50.9 IRON DEFICIENCY ANEMIA, UNSPECIFIED IRON DEFICIENCY ANEMIA TYPE: ICD-10-CM

## 2024-10-25 DIAGNOSIS — Z79.4 TYPE 2 DIABETES MELLITUS WITHOUT COMPLICATION, WITH LONG-TERM CURRENT USE OF INSULIN (H): Primary | ICD-10-CM

## 2024-10-25 DIAGNOSIS — I25.10 CORONARY ARTERY DISEASE INVOLVING NATIVE HEART WITHOUT ANGINA PECTORIS, UNSPECIFIED VESSEL OR LESION TYPE: ICD-10-CM

## 2024-10-25 DIAGNOSIS — E11.9 TYPE 2 DIABETES MELLITUS WITHOUT COMPLICATION, WITH LONG-TERM CURRENT USE OF INSULIN (H): Primary | ICD-10-CM

## 2024-10-25 DIAGNOSIS — I50.22 HEART FAILURE WITH MILDLY REDUCED EJECTION FRACTION (HFMREF) (H): ICD-10-CM

## 2024-10-25 LAB
BASOPHILS # BLD AUTO: 0 10E3/UL (ref 0–0.2)
BASOPHILS NFR BLD AUTO: 0 %
EOSINOPHIL # BLD AUTO: 0.3 10E3/UL (ref 0–0.7)
EOSINOPHIL NFR BLD AUTO: 4 %
ERYTHROCYTE [DISTWIDTH] IN BLOOD BY AUTOMATED COUNT: 15 % (ref 10–15)
EST. AVERAGE GLUCOSE BLD GHB EST-MCNC: 160 MG/DL
HBA1C MFR BLD: 7.2 % (ref 0–5.6)
HCT VFR BLD AUTO: 43.9 % (ref 40–53)
HGB BLD-MCNC: 13.5 G/DL (ref 13.3–17.7)
IMM GRANULOCYTES # BLD: 0 10E3/UL
IMM GRANULOCYTES NFR BLD: 0 %
LYMPHOCYTES # BLD AUTO: 1.8 10E3/UL (ref 0.8–5.3)
LYMPHOCYTES NFR BLD AUTO: 26 %
MCH RBC QN AUTO: 23.2 PG (ref 26.5–33)
MCHC RBC AUTO-ENTMCNC: 30.8 G/DL (ref 31.5–36.5)
MCV RBC AUTO: 75 FL (ref 78–100)
MONOCYTES # BLD AUTO: 0.6 10E3/UL (ref 0–1.3)
MONOCYTES NFR BLD AUTO: 9 %
NEUTROPHILS # BLD AUTO: 4.2 10E3/UL (ref 1.6–8.3)
NEUTROPHILS NFR BLD AUTO: 60 %
PLATELET # BLD AUTO: 265 10E3/UL (ref 150–450)
RBC # BLD AUTO: 5.82 10E6/UL (ref 4.4–5.9)
TSH SERPL DL<=0.005 MIU/L-ACNC: 3.39 UIU/ML (ref 0.3–4.2)
WBC # BLD AUTO: 7 10E3/UL (ref 4–11)

## 2024-10-25 PROCEDURE — 83036 HEMOGLOBIN GLYCOSYLATED A1C: CPT | Performed by: FAMILY MEDICINE

## 2024-10-25 PROCEDURE — 99214 OFFICE O/P EST MOD 30 MIN: CPT | Performed by: FAMILY MEDICINE

## 2024-10-25 PROCEDURE — 85025 COMPLETE CBC W/AUTO DIFF WBC: CPT | Performed by: FAMILY MEDICINE

## 2024-10-25 PROCEDURE — 36415 COLL VENOUS BLD VENIPUNCTURE: CPT | Performed by: FAMILY MEDICINE

## 2024-10-25 PROCEDURE — 84443 ASSAY THYROID STIM HORMONE: CPT | Performed by: FAMILY MEDICINE

## 2024-10-25 RX ORDER — LANCETS
EACH MISCELLANEOUS
Qty: 100 EACH | Refills: 6 | Status: SHIPPED | OUTPATIENT
Start: 2024-10-25

## 2024-10-25 ASSESSMENT — PAIN SCALES - GENERAL: PAINLEVEL_OUTOF10: NO PAIN (0)

## 2024-10-25 NOTE — PROGRESS NOTES
"  Assessment & Plan     Type 2 diabetes mellitus without complication, with long-term current use of insulin (H)  Blood sugar readings been good. Recheck A1c and refill supplies  - TSH WITH FREE T4 REFLEX  - HEMOGLOBIN A1C  - blood glucose monitoring (NO BRAND SPECIFIED) meter device kit  Dispense: 1 kit; Refill: 0  - blood glucose (NO BRAND SPECIFIED) test strip  Dispense: 100 strip; Refill: 6  - thin (NO BRAND SPECIFIED) lancets  Dispense: 100 each; Refill: 6  - TSH WITH FREE T4 REFLEX  - HEMOGLOBIN A1C    Coronary artery disease involving native heart without angina pectoris, unspecified vessel or lesion type   - No limitations in activities, last echo was normal, Visually Estimated EF: 45-50%in 5/2024    Heart failure with mildly reduced ejection fraction (HFmrEF) (H)   -  in remission;     Iron deficiency anemia, unspecified iron deficiency anemia type  - CBC with platelets and differential  - Fecal colorectal cancer screen (FIT)  - CBC with platelets and differential    BMI  Estimated body mass index is 26.34 kg/m  as calculated from the following:    Height as of this encounter: 1.7 m (5' 6.93\").    Weight as of this encounter: 76.1 kg (167 lb 12.8 oz).   Weight management plan: Discussed healthy diet and exercise guidelines  Blood sugar testing frequency justification:  Adjustment of medication(s)    See Patient Instructions    William Humphries is a 64 year old, presenting for the following health issues:  Follow Up  (Last seen 7/26/2024)    DM2   BS in the morning , evening 160-190.    CAD:   Echo was normal   No chest pain   Plays golf 2-3 times a week, and walks 3 times a week     Final Conclusion Previous Study: 5/2/24  Visually Estimated EF: 45-50%  Normal left ventricular size and mildly reduced global systolic function.  The base to mid inferolateral walls are hypokinetic.  Normal right ventricular size and function.  No significant valvular heart disease noted.  When compared to the " previous echocardiographic images of 5/2/24, the LVEF is higher and the  territory of regional wall motion  abnormality is less.     Type 2 diabetes mellitus with hyperglycemia, with long-term current use of insulin (H)   Last A1c 5/14/24 was 13.7 and just started on treatment, will recheck A1c in 2 months. Blood sugars been good on Jardiance and Insulin.  - Albumin Random Urine Quantitative with Creat Ratio  - Hemoglobin A1c  - Comprehensive metabolic panel (BMP + Alb, Alk Phos, ALT, AST, Total. Bili, TP)  - insulin glargine (LANTUS SOLOSTAR) 100 UNIT/ML pen  Dispense: 15 mL; Refill: 3  - Lipase     Coronary artery disease involving native heart without angina pectoris, unspecified vessel or lesion type      S/p Stent placement. On DOAC for 1 year and ASA lifetime.  - Comprehensive metabolic panel (BMP + Alb, Alk Phos, ALT, AST, Total. Bili, TP)     Iron deficiency anemia, unspecified iron deficiency anemia type   Will check if has RBCs in stool will order colonoscopy, for possible chronic GI blood loss  - Fecal colorectal cancer screen (FIT)  - CBC with platelets and differential  - Fecal colorectal cancer screen (FIT)        10/25/2024     8:00 AM   Additional Questions   Roomed by ashley Montaño ma   Accompanied by self     10/25:3 mo f/up: stemi (5/1/24), flu, tdap, covid, tsh,A1c,       History of Present Illness       Diabetes:   He presents for follow up of diabetes.  He is checking home blood glucose two times daily.   He checks blood glucose before meals and at bedtime.  Blood glucose is never over 200 and never under 70.  When his blood glucose is low, the patient is asymptomatic for confusion, blurred vision, lethargy and reports not feeling dizzy, shaky, or weak.   He has no concerns regarding his diabetes at this time.   He is not experiencing numbness or burning in feet, excessive thirst, blurry vision, weight changes or redness, sores or blisters on feet.           He eats 4 or more servings of fruits and  "vegetables daily.He consumes 1 sweetened beverage(s) daily.He exercises with enough effort to increase his heart rate 30 to 60 minutes per day.  He exercises with enough effort to increase his heart rate 4 days per week.   He is taking medications regularly.      Review of Systems  Constitutional, HEENT, cardiovascular, pulmonary, gi and gu systems are negative, except as otherwise noted.      Objective    /76 (BP Location: Left arm, Cuff Size: Adult Regular)   Pulse 78   Temp 97.3  F (36.3  C) (Temporal)   Resp 18   Ht 1.7 m (5' 6.93\")   Wt 76.1 kg (167 lb 12.8 oz)   SpO2 99%   BMI 26.34 kg/m    Body mass index is 26.34 kg/m .  Physical Exam   GENERAL: alert and no distress  RESP: lungs clear to auscultation - no rales, rhonchi or wheezes  CV: regular rate and rhythm, no murmur, click or rub, no peripheral edema  ABDOMEN: soft, nontender, no masses and bowel sounds normal  MS: no gross musculoskeletal defects noted, no edema  PSYCH: mentation appears normal, affect normal/bright      Results for orders placed or performed in visit on 10/25/24   TSH WITH FREE T4 REFLEX     Status: Normal   Result Value Ref Range    TSH 3.39 0.30 - 4.20 uIU/mL   HEMOGLOBIN A1C     Status: Abnormal   Result Value Ref Range    Estimated Average Glucose 160 (H) <117 mg/dL    Hemoglobin A1C 7.2 (H) 0.0 - 5.6 %   CBC with platelets and differential     Status: Abnormal   Result Value Ref Range    WBC Count 7.0 4.0 - 11.0 10e3/uL    RBC Count 5.82 4.40 - 5.90 10e6/uL    Hemoglobin 13.5 13.3 - 17.7 g/dL    Hematocrit 43.9 40.0 - 53.0 %    MCV 75 (L) 78 - 100 fL    MCH 23.2 (L) 26.5 - 33.0 pg    MCHC 30.8 (L) 31.5 - 36.5 g/dL    RDW 15.0 10.0 - 15.0 %    Platelet Count 265 150 - 450 10e3/uL    % Neutrophils 60 %    % Lymphocytes 26 %    % Monocytes 9 %    % Eosinophils 4 %    % Basophils 0 %    % Immature Granulocytes 0 %    Absolute Neutrophils 4.2 1.6 - 8.3 10e3/uL    Absolute Lymphocytes 1.8 0.8 - 5.3 10e3/uL    Absolute " Monocytes 0.6 0.0 - 1.3 10e3/uL    Absolute Eosinophils 0.3 0.0 - 0.7 10e3/uL    Absolute Basophils 0.0 0.0 - 0.2 10e3/uL    Absolute Immature Granulocytes 0.0 <=0.4 10e3/uL   CBC with platelets and differential     Status: Abnormal    Narrative    The following orders were created for panel order CBC with platelets and differential.  Procedure                               Abnormality         Status                     ---------                               -----------         ------                     CBC with platelets and d...[[id number]]  Abnormal            Final result                 Please view results for these tests on the individual orders.       Signed Electronically by: Earl López MD

## 2024-10-28 PROCEDURE — 82274 ASSAY TEST FOR BLOOD FECAL: CPT | Performed by: FAMILY MEDICINE

## 2024-10-31 LAB — HEMOCCULT STL QL IA: NEGATIVE

## 2024-10-31 NOTE — RESULT ENCOUNTER NOTE
Your FIT card test results are normal. This means you are unlikely to have colon cancer. This test for low risk individuals should be repeated yearly until age 75. Request your FIT card from the clinic or at the time of an appointment before it is due next year.     Kiersten Waters MD

## 2025-01-24 ENCOUNTER — OFFICE VISIT (OUTPATIENT)
Dept: FAMILY MEDICINE | Facility: CLINIC | Age: 65
End: 2025-01-24
Payer: COMMERCIAL

## 2025-01-24 VITALS
RESPIRATION RATE: 16 BRPM | OXYGEN SATURATION: 100 % | TEMPERATURE: 96.9 F | HEART RATE: 76 BPM | HEIGHT: 67 IN | BODY MASS INDEX: 28 KG/M2 | WEIGHT: 178.4 LBS | SYSTOLIC BLOOD PRESSURE: 113 MMHG | DIASTOLIC BLOOD PRESSURE: 80 MMHG

## 2025-01-24 DIAGNOSIS — E11.9 TYPE 2 DIABETES MELLITUS WITHOUT COMPLICATION, WITH LONG-TERM CURRENT USE OF INSULIN (H): Primary | ICD-10-CM

## 2025-01-24 DIAGNOSIS — Z79.4 TYPE 2 DIABETES MELLITUS WITHOUT COMPLICATION, WITH LONG-TERM CURRENT USE OF INSULIN (H): Primary | ICD-10-CM

## 2025-01-24 DIAGNOSIS — I10 HTN, GOAL BELOW 140/90: ICD-10-CM

## 2025-01-24 DIAGNOSIS — R71.8 LOW MEAN CORPUSCULAR VOLUME (MCV): ICD-10-CM

## 2025-01-24 DIAGNOSIS — E78.5 HYPERLIPIDEMIA LDL GOAL <100: ICD-10-CM

## 2025-01-24 DIAGNOSIS — I50.22 HEART FAILURE WITH MILDLY REDUCED EJECTION FRACTION (HFMREF) (H): ICD-10-CM

## 2025-01-24 DIAGNOSIS — I25.10 CORONARY ARTERY DISEASE INVOLVING NATIVE CORONARY ARTERY OF NATIVE HEART WITHOUT ANGINA PECTORIS: ICD-10-CM

## 2025-01-24 LAB
ANION GAP SERPL CALCULATED.3IONS-SCNC: 13 MMOL/L (ref 7–15)
BASOPHILS # BLD AUTO: 0 10E3/UL (ref 0–0.2)
BASOPHILS NFR BLD AUTO: 0 %
BUN SERPL-MCNC: 17.3 MG/DL (ref 8–23)
CALCIUM SERPL-MCNC: 9.5 MG/DL (ref 8.8–10.4)
CHLORIDE SERPL-SCNC: 105 MMOL/L (ref 98–107)
CREAT SERPL-MCNC: 1.03 MG/DL (ref 0.67–1.17)
EGFRCR SERPLBLD CKD-EPI 2021: 81 ML/MIN/1.73M2
EOSINOPHIL # BLD AUTO: 0.3 10E3/UL (ref 0–0.7)
EOSINOPHIL NFR BLD AUTO: 5 %
ERYTHROCYTE [DISTWIDTH] IN BLOOD BY AUTOMATED COUNT: 14 % (ref 10–15)
EST. AVERAGE GLUCOSE BLD GHB EST-MCNC: 157 MG/DL
GLUCOSE SERPL-MCNC: 137 MG/DL (ref 70–99)
HBA1C MFR BLD: 7.1 % (ref 0–5.6)
HCO3 SERPL-SCNC: 23 MMOL/L (ref 22–29)
HCT VFR BLD AUTO: 43.1 % (ref 40–53)
HGB BLD-MCNC: 13.4 G/DL (ref 13.3–17.7)
IMM GRANULOCYTES # BLD: 0 10E3/UL
IMM GRANULOCYTES NFR BLD: 0 %
LYMPHOCYTES # BLD AUTO: 1.7 10E3/UL (ref 0.8–5.3)
LYMPHOCYTES NFR BLD AUTO: 28 %
MCH RBC QN AUTO: 23.1 PG (ref 26.5–33)
MCHC RBC AUTO-ENTMCNC: 31.1 G/DL (ref 31.5–36.5)
MCV RBC AUTO: 74 FL (ref 78–100)
MONOCYTES # BLD AUTO: 0.5 10E3/UL (ref 0–1.3)
MONOCYTES NFR BLD AUTO: 8 %
NEUTROPHILS # BLD AUTO: 3.5 10E3/UL (ref 1.6–8.3)
NEUTROPHILS NFR BLD AUTO: 59 %
PLATELET # BLD AUTO: 267 10E3/UL (ref 150–450)
POTASSIUM SERPL-SCNC: 4.6 MMOL/L (ref 3.4–5.3)
RBC # BLD AUTO: 5.81 10E6/UL (ref 4.4–5.9)
SODIUM SERPL-SCNC: 141 MMOL/L (ref 135–145)
WBC # BLD AUTO: 5.9 10E3/UL (ref 4–11)

## 2025-01-24 PROCEDURE — 99214 OFFICE O/P EST MOD 30 MIN: CPT | Performed by: FAMILY MEDICINE

## 2025-01-24 PROCEDURE — 83036 HEMOGLOBIN GLYCOSYLATED A1C: CPT | Performed by: FAMILY MEDICINE

## 2025-01-24 PROCEDURE — 80048 BASIC METABOLIC PNL TOTAL CA: CPT | Performed by: FAMILY MEDICINE

## 2025-01-24 PROCEDURE — 85025 COMPLETE CBC W/AUTO DIFF WBC: CPT | Performed by: FAMILY MEDICINE

## 2025-01-24 PROCEDURE — 36415 COLL VENOUS BLD VENIPUNCTURE: CPT | Performed by: FAMILY MEDICINE

## 2025-01-24 ASSESSMENT — PAIN SCALES - GENERAL: PAINLEVEL_OUTOF10: NO PAIN (0)

## 2025-01-24 NOTE — PROGRESS NOTES
Assessment & Plan     Type 2 diabetes mellitus without complication, with long-term current use of insulin (H)   Well controlled on Lantus insulin 15 units, Metformin and Jardiance.  - HEMOGLOBIN A1C  - BASIC METABOLIC PANEL    HTN, goal below 140/90   BP well controlled     Hyperlipidemia LDL goal <100    - On Rosuvastatin, Plavix and ASA    Heart failure with mildly reduced ejection fraction (HFmrEF) (H)    -  following with cardiology; has stress test on 2/6/25      Coronary artery disease involving native coronary artery of native heart without angina pectoris    -  found to have acute total occlusion of pLCx, severe disease in pPDA, ostial and mid LAD, D1. S/p PCI to pLCx and pPDA and PCI to LAD. On Rosuvastatin, Plavix and ASA   following with cardiology; has stress test on 2/6/25    - CBC with platelets and differential    Low mean corpuscular volume (MCV)   FIT test negative x 2 in last year  - CBC with platelets and differential    Blood sugar testing frequency justification:  Risk of hypoglycemia with medication(s)    See Patient Instructions    Subjective   Yandel is a 64 year old, presenting for the following health issues:  RECHECK (Last seen 10/25/2024)    DM2: checks BS twice daily Morning 100-120 and evening 180-210; high when sometimes eats what not supposed to  If high can increase insulin to 18 units daily.    CAD/STEMI: following with cardiology; has stress test on 2/6/25    HTN:   BP Readings from Last 3 Encounters:   01/24/25 113/80   10/25/24 111/76   07/26/24 117/77      Shots:    Will do shingles shot at pharmacy   TDAP: did 5 years ago at [facility], [facility]st and 2nd        1/24/2025     8:16 AM   Additional Questions   Roomed by thomas abad   Accompanied by self     History of Present Illness       Diabetes:   He presents for follow up of diabetes.  He is checking home blood glucose two times daily.   He checks blood glucose before meals and at bedtime.  Blood glucose is sometimes over 200 and never  "under 70.  When his blood glucose is low, the patient is asymptomatic for confusion, blurred vision, lethargy and reports not feeling dizzy, shaky, or weak.   He has no concerns regarding his diabetes at this time.   He is not experiencing numbness or burning in feet, excessive thirst, blurry vision, weight changes or redness, sores or blisters on feet.           Heart Failure:  He presents for follow up of heart failure. He is not experiencing shortness of breath at night, with rest or with activity  He is not experiencing any lower extremity edema.   He denies orthopenea and is not coughing at night. Patient is not checking weight daily. He has recently had a None.  He has no side effects from medications.  He has had no other medical visits for heart failure since the last visit.    He eats 2-3 servings of fruits and vegetables daily.He consumes 1 sweetened beverage(s) daily.He exercises with enough effort to increase his heart rate 20 to 29 minutes per day.  He exercises with enough effort to increase his heart rate 4 days per week.   He is taking medications regularly.     Last Echo: up coming 2/6/25      Review of Systems  Constitutional, neuro, ENT, endocrine, pulmonary, cardiac, gastrointestinal, genitourinary, musculoskeletal, integument and psychiatric systems are negative, except as otherwise noted.      Objective    /80   Pulse 76   Temp 96.9  F (36.1  C) (Temporal)   Resp 16   Ht 1.7 m (5' 6.93\")   Wt 80.9 kg (178 lb 6.4 oz)   SpO2 100%   BMI 28.00 kg/m    Body mass index is 28 kg/m .  Physical Exam   GENERAL: alert and no distress  RESP: lungs clear to auscultation - no rales, rhonchi or wheezes  CV: regular rate and rhythm, no murmur, click or rub, no peripheral edema  ABDOMEN: soft, nontender, no hepatosplenomegaly, no masses and bowel sounds normal  MS: no gross musculoskeletal defects noted, no edema  NEURO: Normal strength and tone, mentation intact and speech normal  PSYCH: mentation " appears normal, affect normal/bright    Signed Electronically by: Earl López MD  Results for orders placed or performed in visit on 01/24/25   HEMOGLOBIN A1C     Status: Abnormal   Result Value Ref Range    Estimated Average Glucose 157 (H) <117 mg/dL    Hemoglobin A1C 7.1 (H) 0.0 - 5.6 %   BASIC METABOLIC PANEL     Status: Abnormal   Result Value Ref Range    Sodium 141 135 - 145 mmol/L    Potassium 4.6 3.4 - 5.3 mmol/L    Chloride 105 98 - 107 mmol/L    Carbon Dioxide (CO2) 23 22 - 29 mmol/L    Anion Gap 13 7 - 15 mmol/L    Urea Nitrogen 17.3 8.0 - 23.0 mg/dL    Creatinine 1.03 0.67 - 1.17 mg/dL    GFR Estimate 81 >60 mL/min/1.73m2    Calcium 9.5 8.8 - 10.4 mg/dL    Glucose 137 (H) 70 - 99 mg/dL   CBC with platelets and differential     Status: Abnormal   Result Value Ref Range    WBC Count 5.9 4.0 - 11.0 10e3/uL    RBC Count 5.81 4.40 - 5.90 10e6/uL    Hemoglobin 13.4 13.3 - 17.7 g/dL    Hematocrit 43.1 40.0 - 53.0 %    MCV 74 (L) 78 - 100 fL    MCH 23.1 (L) 26.5 - 33.0 pg    MCHC 31.1 (L) 31.5 - 36.5 g/dL    RDW 14.0 10.0 - 15.0 %    Platelet Count 267 150 - 450 10e3/uL    % Neutrophils 59 %    % Lymphocytes 28 %    % Monocytes 8 %    % Eosinophils 5 %    % Basophils 0 %    % Immature Granulocytes 0 %    Absolute Neutrophils 3.5 1.6 - 8.3 10e3/uL    Absolute Lymphocytes 1.7 0.8 - 5.3 10e3/uL    Absolute Monocytes 0.5 0.0 - 1.3 10e3/uL    Absolute Eosinophils 0.3 0.0 - 0.7 10e3/uL    Absolute Basophils 0.0 0.0 - 0.2 10e3/uL    Absolute Immature Granulocytes 0.0 <=0.4 10e3/uL   CBC with platelets and differential     Status: Abnormal    Narrative    The following orders were created for panel order CBC with platelets and differential.  Procedure                               Abnormality         Status                     ---------                               -----------         ------                     CBC with platelets and d...[[id number]]  Abnormal            Final result                 Please  view results for these tests on the individual orders.

## 2025-01-26 NOTE — ADDENDUM NOTE
Addended by: MARIA DEL ROSARIO MARTINEZ on: 1/25/2025 06:40 PM     Modules accepted: Level of Service

## 2025-01-28 ENCOUNTER — TELEPHONE (OUTPATIENT)
Dept: PHARMACY | Facility: OTHER | Age: 65
End: 2025-01-28
Payer: COMMERCIAL

## 2025-01-28 NOTE — TELEPHONE ENCOUNTER
LLUVIA Recruitment: [facility] insurance     Referral outreach attempt #1 on January 28, 2025      Outcome: left voicemail- Call back number [phone] and Bilibott message sent    LLUVIA Kwok

## 2025-03-24 ENCOUNTER — TELEPHONE (OUTPATIENT)
Dept: PHARMACY | Facility: OTHER | Age: 65
End: 2025-03-24
Payer: COMMERCIAL

## 2025-03-24 NOTE — TELEPHONE ENCOUNTER
LLUVIA Recruitment: [facility] insurance     Referral outreach attempt #2 on March 24, 2025      Outcome: left voicemail- Call back number [phone]    LLUVIA Kwok

## 2025-05-11 ENCOUNTER — HEALTH MAINTENANCE LETTER (OUTPATIENT)
Age: 65
End: 2025-05-11

## 2025-08-24 ENCOUNTER — HEALTH MAINTENANCE LETTER (OUTPATIENT)
Age: 65
End: 2025-08-24